# Patient Record
Sex: MALE | Race: WHITE | HISPANIC OR LATINO
[De-identification: names, ages, dates, MRNs, and addresses within clinical notes are randomized per-mention and may not be internally consistent; named-entity substitution may affect disease eponyms.]

---

## 2023-06-23 PROBLEM — Z00.00 ENCOUNTER FOR PREVENTIVE HEALTH EXAMINATION: Status: ACTIVE | Noted: 2023-06-23

## 2023-06-27 ENCOUNTER — APPOINTMENT (OUTPATIENT)
Dept: NEUROSURGERY | Facility: CLINIC | Age: 58
End: 2023-06-27
Payer: COMMERCIAL

## 2023-06-27 VITALS
OXYGEN SATURATION: 100 % | SYSTOLIC BLOOD PRESSURE: 142 MMHG | HEIGHT: 69 IN | DIASTOLIC BLOOD PRESSURE: 77 MMHG | HEART RATE: 57 BPM | BODY MASS INDEX: 22.81 KG/M2 | TEMPERATURE: 98.1 F | WEIGHT: 154 LBS

## 2023-06-27 DIAGNOSIS — R20.2 ANESTHESIA OF SKIN: ICD-10-CM

## 2023-06-27 DIAGNOSIS — M47.812 SPONDYLOSIS W/OUT MYELOPATHY OR RADICULOPATHY, CERVICAL REGION: ICD-10-CM

## 2023-06-27 DIAGNOSIS — Z78.9 OTHER SPECIFIED HEALTH STATUS: ICD-10-CM

## 2023-06-27 DIAGNOSIS — Z87.39 PERSONAL HISTORY OF OTHER DISEASES OF THE MUSCULOSKELETAL SYSTEM AND CONNECTIVE TISSUE: ICD-10-CM

## 2023-06-27 DIAGNOSIS — M54.14 RADICULOPATHY, THORACIC REGION: ICD-10-CM

## 2023-06-27 DIAGNOSIS — R20.0 ANESTHESIA OF SKIN: ICD-10-CM

## 2023-06-27 PROCEDURE — 99203 OFFICE O/P NEW LOW 30 MIN: CPT

## 2023-06-27 RX ORDER — UBROGEPANT 100 MG/1
TABLET ORAL
Refills: 0 | Status: ACTIVE | COMMUNITY

## 2023-07-01 NOTE — PHYSICAL EXAM
[General Appearance - Alert] : alert [General Appearance - In No Acute Distress] : in no acute distress [Oriented To Time, Place, And Person] : oriented to person, place, and time [No Visual Abnormalities] : no visible abnormailities [] : no respiratory distress [Heart Rate And Rhythm] : heart rate was normal and rhythm regular [No Deficits] : no sensory deficits [5] : 5/5 Ankle Plantar Flexion (S1)

## 2023-07-01 NOTE — ASSESSMENT
[FreeTextEntry1] : Mr. SURESH HU is presenting Thoracic tingling and numbness\par The recommendation is for the following:\par Imaging evaluation shows evidence of cervical and lumbar spine degenerative disease\par \par Plan:\par Conservative management recommended.\par \par Follow up as needed.\par \par \par \par

## 2023-07-01 NOTE — HISTORY OF PRESENT ILLNESS
[> 3 months] : more  than 3 months [FreeTextEntry1] : tingling in thoracic spine [de-identified] : 58 yr old male with history of margines seeing by Dr. Jean-Baptiste. He comes in  complaining of tingling in the thoracic spine. He has no significant pain in the neck. He has numbness of the hands. He has no arm pain. He has occasional balance problems. He has normal bowel and bladder function. He has no weakness. He has not done PT.

## 2023-07-01 NOTE — HISTORY OF PRESENT ILLNESS
[> 3 months] : more  than 3 months [FreeTextEntry1] : tingling in thoracic spine [de-identified] : 58 yr old male with history of margines seeing by Dr. Jean-Baptiste. He comes in  complaining of tingling in the thoracic spine. He has no significant pain in the neck. He has numbness of the hands. He has no arm pain. He has occasional balance problems. He has normal bowel and bladder function. He has no weakness. He has not done PT.

## 2025-02-07 ENCOUNTER — NON-APPOINTMENT (OUTPATIENT)
Age: 60
End: 2025-02-07